# Patient Record
Sex: FEMALE | Race: WHITE | HISPANIC OR LATINO
[De-identification: names, ages, dates, MRNs, and addresses within clinical notes are randomized per-mention and may not be internally consistent; named-entity substitution may affect disease eponyms.]

---

## 2019-01-04 ENCOUNTER — TRANSCRIPTION ENCOUNTER (OUTPATIENT)
Age: 24
End: 2019-01-04

## 2019-10-09 ENCOUNTER — LABORATORY RESULT (OUTPATIENT)
Age: 24
End: 2019-10-09

## 2019-10-10 ENCOUNTER — APPOINTMENT (OUTPATIENT)
Dept: FAMILY MEDICINE | Facility: CLINIC | Age: 24
End: 2019-10-10
Payer: OTHER GOVERNMENT

## 2019-10-10 VITALS
BODY MASS INDEX: 21.69 KG/M2 | HEIGHT: 66 IN | TEMPERATURE: 98.4 F | RESPIRATION RATE: 16 BRPM | HEART RATE: 89 BPM | WEIGHT: 135 LBS | SYSTOLIC BLOOD PRESSURE: 114 MMHG | OXYGEN SATURATION: 98 % | DIASTOLIC BLOOD PRESSURE: 81 MMHG

## 2019-10-10 DIAGNOSIS — Z13.220 ENCOUNTER FOR SCREENING FOR LIPOID DISORDERS: ICD-10-CM

## 2019-10-10 DIAGNOSIS — Z83.3 FAMILY HISTORY OF DIABETES MELLITUS: ICD-10-CM

## 2019-10-10 DIAGNOSIS — Z78.9 OTHER SPECIFIED HEALTH STATUS: ICD-10-CM

## 2019-10-10 DIAGNOSIS — Z13.1 ENCOUNTER FOR SCREENING FOR DIABETES MELLITUS: ICD-10-CM

## 2019-10-10 DIAGNOSIS — Z80.3 FAMILY HISTORY OF MALIGNANT NEOPLASM OF BREAST: ICD-10-CM

## 2019-10-10 DIAGNOSIS — Z13.21 ENCOUNTER FOR SCREENING FOR NUTRITIONAL DISORDER: ICD-10-CM

## 2019-10-10 PROCEDURE — 99385 PREV VISIT NEW AGE 18-39: CPT | Mod: 25

## 2019-10-10 PROCEDURE — 36415 COLL VENOUS BLD VENIPUNCTURE: CPT

## 2019-10-10 NOTE — ASSESSMENT
[FreeTextEntry1] : CPE_ Well exam, comprehensive labs ordered. STD panel sent. PAP scheduled for today with GYN. F/u results.

## 2019-10-10 NOTE — HISTORY OF PRESENT ILLNESS
[de-identified] : 25 yo female here for CPE and establishment of care. Feeling well today, no complaints. \par \par H/o RA diagnosed at age 17. Had been seeing a rheumatologist. Used to have a lot of joint pain, but did some dietary modifications and the pain improved. Hasn't seen a rheum in a long time. No joint pain recently.

## 2019-10-10 NOTE — PHYSICAL EXAM
[No Acute Distress] : no acute distress [Well Nourished] : well nourished [Well Developed] : well developed [Normal Sclera/Conjunctiva] : normal sclera/conjunctiva [Well-Appearing] : well-appearing [PERRL] : pupils equal round and reactive to light [EOMI] : extraocular movements intact [Normal Oropharynx] : the oropharynx was normal [Normal Outer Ear/Nose] : the outer ears and nose were normal in appearance [Supple] : supple [No Lymphadenopathy] : no lymphadenopathy [No JVD] : no jugular venous distention [Thyroid Normal, No Nodules] : the thyroid was normal and there were no nodules present [No Respiratory Distress] : no respiratory distress  [Clear to Auscultation] : lungs were clear to auscultation bilaterally [No Accessory Muscle Use] : no accessory muscle use [Regular Rhythm] : with a regular rhythm [Normal Rate] : normal rate  [Normal S1, S2] : normal S1 and S2 [No Murmur] : no murmur heard [No Varicosities] : no varicosities [No Abdominal Bruit] : a ~M bruit was not heard ~T in the abdomen [No Edema] : there was no peripheral edema [No Palpable Aorta] : no palpable aorta [Soft] : abdomen soft [No Extremity Clubbing/Cyanosis] : no extremity clubbing/cyanosis [Non Tender] : non-tender [Non-distended] : non-distended [No Masses] : no abdominal mass palpated [No HSM] : no HSM [Normal Bowel Sounds] : normal bowel sounds [No CVA Tenderness] : no CVA  tenderness [Normal Posterior Cervical Nodes] : no posterior cervical lymphadenopathy [Normal Anterior Cervical Nodes] : no anterior cervical lymphadenopathy [Grossly Normal Strength/Tone] : grossly normal strength/tone [No Spinal Tenderness] : no spinal tenderness [No Joint Swelling] : no joint swelling [No Rash] : no rash [Coordination Grossly Intact] : coordination grossly intact [No Focal Deficits] : no focal deficits [Normal Affect] : the affect was normal [Deep Tendon Reflexes (DTR)] : deep tendon reflexes were 2+ and symmetric [Normal Gait] : normal gait [Normal Insight/Judgement] : insight and judgment were intact

## 2019-10-10 NOTE — HEALTH RISK ASSESSMENT
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

## 2019-10-11 ENCOUNTER — TRANSCRIPTION ENCOUNTER (OUTPATIENT)
Age: 24
End: 2019-10-11

## 2019-10-11 LAB
25(OH)D3 SERPL-MCNC: 11.9 NG/ML
ALBUMIN SERPL ELPH-MCNC: 4.5 G/DL
ALP BLD-CCNC: 85 U/L
ALT SERPL-CCNC: 18 U/L
ANION GAP SERPL CALC-SCNC: 14 MMOL/L
AST SERPL-CCNC: 20 U/L
BASOPHILS # BLD AUTO: 0.03 K/UL
BASOPHILS NFR BLD AUTO: 0.7 %
BILIRUB SERPL-MCNC: 0.3 MG/DL
BUN SERPL-MCNC: 15 MG/DL
CALCIUM SERPL-MCNC: 9.5 MG/DL
CHLORIDE SERPL-SCNC: 104 MMOL/L
CHOLEST SERPL-MCNC: 124 MG/DL
CHOLEST/HDLC SERPL: 2.4 RATIO
CO2 SERPL-SCNC: 22 MMOL/L
CREAT SERPL-MCNC: 0.66 MG/DL
EOSINOPHIL # BLD AUTO: 0.08 K/UL
EOSINOPHIL NFR BLD AUTO: 1.7 %
ESTIMATED AVERAGE GLUCOSE: 103 MG/DL
FOLATE SERPL-MCNC: 8.9 NG/ML
GLUCOSE SERPL-MCNC: 92 MG/DL
HAV IGM SER QL: NONREACTIVE
HBA1C MFR BLD HPLC: 5.2 %
HBV CORE IGM SER QL: NONREACTIVE
HBV SURFACE AG SER QL: NONREACTIVE
HCT VFR BLD CALC: 42.4 %
HCV AB SER QL: NONREACTIVE
HCV S/CO RATIO: 0.15 S/CO
HDLC SERPL-MCNC: 52 MG/DL
HGB BLD-MCNC: 13.4 G/DL
HIV1+2 AB SPEC QL IA.RAPID: NONREACTIVE
IMM GRANULOCYTES NFR BLD AUTO: 0.2 %
LDLC SERPL CALC-MCNC: 64 MG/DL
LYMPHOCYTES # BLD AUTO: 1.98 K/UL
LYMPHOCYTES NFR BLD AUTO: 43.1 %
MAN DIFF?: NORMAL
MCHC RBC-ENTMCNC: 30 PG
MCHC RBC-ENTMCNC: 31.6 GM/DL
MCV RBC AUTO: 94.9 FL
MONOCYTES # BLD AUTO: 0.33 K/UL
MONOCYTES NFR BLD AUTO: 7.2 %
NEUTROPHILS # BLD AUTO: 2.16 K/UL
NEUTROPHILS NFR BLD AUTO: 47.1 %
PLATELET # BLD AUTO: 352 K/UL
POTASSIUM SERPL-SCNC: 4.4 MMOL/L
PROT SERPL-MCNC: 7.2 G/DL
RBC # BLD: 4.47 M/UL
RBC # FLD: 12.5 %
SODIUM SERPL-SCNC: 140 MMOL/L
T PALLIDUM AB SER QL IA: NEGATIVE
T4 FREE SERPL-MCNC: 1.2 NG/DL
TRIGL SERPL-MCNC: 38 MG/DL
TSH SERPL-ACNC: 1.36 UIU/ML
VIT B12 SERPL-MCNC: 271 PG/ML
WBC # FLD AUTO: 4.59 K/UL

## 2019-10-16 ENCOUNTER — TRANSCRIPTION ENCOUNTER (OUTPATIENT)
Age: 24
End: 2019-10-16

## 2019-11-11 ENCOUNTER — LABORATORY RESULT (OUTPATIENT)
Age: 24
End: 2019-11-11

## 2019-11-12 ENCOUNTER — APPOINTMENT (OUTPATIENT)
Dept: RADIOLOGY | Facility: CLINIC | Age: 24
End: 2019-11-12

## 2019-11-12 ENCOUNTER — APPOINTMENT (OUTPATIENT)
Dept: RHEUMATOLOGY | Facility: CLINIC | Age: 24
End: 2019-11-12
Payer: OTHER GOVERNMENT

## 2019-11-12 VITALS
BODY MASS INDEX: 21.79 KG/M2 | DIASTOLIC BLOOD PRESSURE: 76 MMHG | HEIGHT: 66 IN | OXYGEN SATURATION: 98 % | WEIGHT: 135.56 LBS | TEMPERATURE: 98.6 F | SYSTOLIC BLOOD PRESSURE: 115 MMHG | HEART RATE: 86 BPM

## 2019-11-12 DIAGNOSIS — H04.123 DRY EYE SYNDROME OF BILATERAL LACRIMAL GLANDS: ICD-10-CM

## 2019-11-12 DIAGNOSIS — K90.9 INTESTINAL MALABSORPTION, UNSPECIFIED: ICD-10-CM

## 2019-11-12 PROCEDURE — 99205 OFFICE O/P NEW HI 60 MIN: CPT | Mod: 25

## 2019-11-12 PROCEDURE — 36415 COLL VENOUS BLD VENIPUNCTURE: CPT

## 2019-11-12 NOTE — PHYSICAL EXAM
[General Appearance - Alert] : alert [General Appearance - In No Acute Distress] : in no acute distress [General Appearance - Well Nourished] : well nourished [General Appearance - Well Developed] : well developed [Extraocular Movements] : extraocular movements were intact [Sclera] : the sclera and conjunctiva were normal [General Appearance - Well-Appearing] : healthy appearing [Outer Ear] : the ears and nose were normal in appearance [Oropharynx] : the oropharynx was normal [Examination Of The Oral Cavity] : the lips and gums were normal [Neck Appearance] : the appearance of the neck was normal [Neck Cervical Mass (___cm)] : no neck mass was observed [Jugular Venous Distention Increased] : there was no jugular-venous distention [Thyroid Nodule] : there were no palpable thyroid nodules [Thyroid Diffuse Enlargement] : the thyroid was not enlarged [Heart Rate And Rhythm] : heart rate was normal and rhythm regular [Auscultation Breath Sounds / Voice Sounds] : lungs were clear to auscultation bilaterally [Heart Sounds] : normal S1 and S2 [Heart Sounds Gallop] : no gallops [Murmurs] : no murmurs [Heart Sounds Pericardial Friction Rub] : no pericardial rub [Full Pulse] : the pedal pulses are present [Edema] : there was no peripheral edema [Veins - Varicosity Changes] : there were no varicosital changes [Bowel Sounds] : normal bowel sounds [Abdomen Soft] : soft [Abdomen Tenderness] : non-tender [Abdomen Mass (___ Cm)] : no abdominal mass palpated [Cervical Lymph Nodes Enlarged Posterior Bilaterally] : posterior cervical [Cervical Lymph Nodes Enlarged Anterior Bilaterally] : anterior cervical [Supraclavicular Lymph Nodes Enlarged Bilaterally] : supraclavicular [No CVA Tenderness] : no ~M costovertebral angle tenderness [Axillary Lymph Nodes Enlarged Bilaterally] : axillary [Abnormal Walk] : normal gait [Musculoskeletal - Swelling] : no joint swelling seen [Nail Clubbing] : no clubbing  or cyanosis of the fingernails [Motor Tone] : muscle strength and tone were normal [] : no rash [Skin Lesions] : no skin lesions [Motor Exam] : the motor exam was normal [Sensation] : the sensory exam was normal to light touch and pinprick [Impaired Insight] : insight and judgment were intact [Oriented To Time, Place, And Person] : oriented to person, place, and time [Mood] : the mood was normal [Affect] : the affect was normal [FreeTextEntry1] : no small or large joint tenderness or synovitis, b/l elbow hypermobile, no other joint hypermobility.

## 2019-11-12 NOTE — ASSESSMENT
[FreeTextEntry1] : 23 y/o F with hx of Vit D deficiency, ? RA diagnosed at age 17 based on + RF and joint symptoms, saw Pediatric Rheumatologist once and has never received any RA treatment. \par 2 episode of seizures and ? stroke as a teenager ? chronic migraine HA. \par Her symptoms almost resolved with very occasional knee pain and b/l hand/finger pain. \par Most recent work up  labs noted negative  PAU, RF, CCP and ESR/CRP\par Pt ROS and exam unremarkable. \par NO evidence of active arthritis or deformities. \par At present low suspicious for RA or any inflammatory/autoimmune disease. \par \par Pt will get back to me with the name of previous Rheumatologist to obtain records, most likely she had transient RF elevation in the setting of viral infection. \par Check urine pregnancy test and if negative she can proceed for hand/wrist and knee xrays to see if any evidence of RA related joint erosions or damage. \par APL serology \par r/u Celiac disease as pt reports chronic Vit D deficiency and hx of arthralgia and resolution of her symptoms after diet modification in the past. \par \par f/u in 3 months. \par \par

## 2019-11-12 NOTE — REVIEW OF SYSTEMS
[Dry Eyes] : dryness of the eyes [Palpitations] : palpitations [Chills] : no chills [Fever] : no fever [Feeling Tired] : not feeling tired [Feeling Poorly] : not feeling poorly [Recent Weight Loss (___ Lbs)] : no recent weight loss [Recent Weight Gain (___ Lbs)] : no recent weight gain [Red Eyes] : eyes not red [Eyesight Problems] : no eyesight problems [Discharge From Eyes] : no purulent discharge from the eyes [Eyes Itch] : no itching of the eyes [Earache] : no earache [Nosebleeds] : no nosebleeds [Loss Of Hearing] : no hearing loss [Sore Throat] : no sore throat [Nasal Discharge] : no nasal discharge [Hoarseness] : no hoarseness [Chest Pain] : no chest pain [Heart Rate Is Slow] : the heart rate was not slow [Heart Rate Is Fast] : the heart rate was not fast [Lower Ext Edema] : no lower extremity edema [Leg Claudication] : no intermittent leg claudication [Wheezing] : no wheezing [Shortness Of Breath] : no shortness of breath [SOB on Exertion] : no shortness of breath during exertion [Abdominal Pain] : no abdominal pain [Cough] : no cough [Constipation] : no constipation [Vomiting] : no vomiting [Diarrhea] : no diarrhea [Melena] : no melena [Heartburn] : no heartburn [Joint Pain] : no joint pain [Arthralgias] : no arthralgias [Joint Stiffness] : no joint stiffness [Joint Swelling] : no joint swelling [Anxiety] : no anxiety [Sleep Disturbances] : no sleep disturbances [Depression] : no depression [Muscle Weakness] : no muscle weakness [Easy Bleeding] : no tendency for easy bleeding [Easy Bruising] : no tendency for easy bruising [Swollen Glands In The Neck] : no swollen glands in the neck [Swollen Glands] : no swollen glands

## 2019-11-12 NOTE — HISTORY OF PRESENT ILLNESS
[Anorexia] : anorexia [Arthralgias] : arthralgias [Dry Eyes] : dry eyes [Weight Loss] : no weight loss [FreeTextEntry1] : Referred by Dr. Castro for Rheumatology consultation\par \par 23 y/o F chronic Vit D deficiency since childhood required replacement   RA diagnosed at age 17. Had been seeing a pediatric rheumatologist at [facility], does not remember the name, told to have + RF, but denies treatment with steroids, DMARDs, IA steroid injection or biologics.  Used to have a lot of joint pain, but did some dietary modifications and the pain improved. Hasn't seen a rheum in a long time. No joint pain recently. \par hx of 2 episodes of seizures at age 16, had MRI and CT done and told possible old stroke ? no recurrence of seizure, has chronic  HA, diagnosed migraine. \par She had recent Rheum work up with PMD and labs noted negative for PAU, RF, CCP and ESR/CRP\par intermittently she feels b/l knee pain and hand pain, occasionally finger swelling\par Denies hx of DVT or bleeding. \par No hx of uveitis\par No h/o morning stiffness, memory loss, patchy hair loss, sicca symptoms, photosensitivity, HA,  Raynaud's, oral ulcers, nasal ulcers. \par Personal or family hx of autoimmune disease, no hx of psoriasis\par \par  [Malaise] : no malaise [Chills] : no chills [Fever] : no fever [Depression] : no depression [Fatigue] : no fatigue [Malar Facial Rash] : no malar facial rash [Skin Lesions] : no lesions [Skin Nodules] : no skin nodules [Cough] : no cough [Oral Ulcers] : no oral ulcers [Dysphonia] : no dysphonia [Dry Mouth] : no dry mouth [Dysphagia] : no dysphagia [Chest Pain] : no chest pain [Joint Swelling] : no joint swelling [Shortness of Breath] : no shortness of breath [Joint Deformity] : no joint deformity [Decreased ROM] : no decreased range of motion [Joint Warmth] : no joint warmth [Morning Stiffness] : no morning stiffness [Falls] : no falls [Difficulty Standing] : no difficulty standing [Difficulty Walking] : no difficulty walking [Dyspnea] : no dyspnea [Myalgias] : no myalgias [Muscle Spasms] : no muscle spasms [Muscle Cramping] : no muscle cramping [Muscle Weakness] : no muscle weakness [Eye Redness] : no eye redness [Visual Changes] : no visual changes [Eye Pain] : no eye pain

## 2019-11-13 LAB
DEPRECATED KAPPA LC FREE/LAMBDA SER: 0.89 RATIO
IGA SER QL IEP: 185 MG/DL
IGA SER QL IEP: 185 MG/DL
IGG SER QL IEP: 1047 MG/DL
IGM SER QL IEP: 297 MG/DL
KAPPA LC CSF-MCNC: 1.71 MG/DL
KAPPA LC SERPL-MCNC: 1.53 MG/DL
TTG IGA SER IA-ACNC: <1.2 U/ML
TTG IGA SER-ACNC: NEGATIVE
TTG IGG SER IA-ACNC: 1.3 U/ML
TTG IGG SER IA-ACNC: NEGATIVE

## 2019-11-14 LAB
B2 GLYCOPROT1 IGA SERPL IA-ACNC: <5 SAU
B2 GLYCOPROT1 IGG SER-ACNC: <5 SGU
B2 GLYCOPROT1 IGM SER-ACNC: <5 SMU
DEPRECATED CARDIOLIPIN IGA SER: <5 APL
ENDOMYSIUM IGA SER QL: NEGATIVE
ENDOMYSIUM IGA TITR SER: NORMAL

## 2019-11-15 ENCOUNTER — RESULT REVIEW (OUTPATIENT)
Age: 24
End: 2019-11-15

## 2019-11-15 LAB
CARDIOLIPIN IGM SER-MCNC: 25.8 MPL
CARDIOLIPIN IGM SER-MCNC: 6.8 GPL
ENA SS-A AB SER IA-ACNC: <0.2 AL
ENA SS-B AB SER IA-ACNC: <0.2 AL

## 2019-11-18 ENCOUNTER — APPOINTMENT (OUTPATIENT)
Dept: RADIOLOGY | Facility: CLINIC | Age: 24
End: 2019-11-18
Payer: OTHER GOVERNMENT

## 2019-11-18 ENCOUNTER — OUTPATIENT (OUTPATIENT)
Dept: OUTPATIENT SERVICES | Facility: HOSPITAL | Age: 24
LOS: 1 days | End: 2019-11-18

## 2019-11-18 PROCEDURE — 71046 X-RAY EXAM CHEST 2 VIEWS: CPT | Mod: 26

## 2019-11-18 PROCEDURE — 73130 X-RAY EXAM OF HAND: CPT | Mod: 26,50

## 2019-11-18 PROCEDURE — 73564 X-RAY EXAM KNEE 4 OR MORE: CPT | Mod: 26,50

## 2019-11-18 PROCEDURE — 73110 X-RAY EXAM OF WRIST: CPT | Mod: 26,50

## 2019-11-21 ENCOUNTER — APPOINTMENT (OUTPATIENT)
Dept: INFECTIOUS DISEASE | Facility: CLINIC | Age: 24
End: 2019-11-21
Payer: OTHER GOVERNMENT

## 2019-11-21 VITALS
HEART RATE: 97 BPM | WEIGHT: 135 LBS | BODY MASS INDEX: 21.69 KG/M2 | SYSTOLIC BLOOD PRESSURE: 124 MMHG | TEMPERATURE: 98.5 F | HEIGHT: 66 IN | OXYGEN SATURATION: 98 % | DIASTOLIC BLOOD PRESSURE: 85 MMHG | RESPIRATION RATE: 16 BRPM

## 2019-11-21 DIAGNOSIS — M06.9 RHEUMATOID ARTHRITIS, UNSPECIFIED: ICD-10-CM

## 2019-11-21 PROCEDURE — 99204 OFFICE O/P NEW MOD 45 MIN: CPT

## 2019-11-21 NOTE — HISTORY OF PRESENT ILLNESS
[FreeTextEntry1] : 23 yo female with ?RA presenting for initial ID evaluation of positive QFT 11/12/19. She is originally from [place]; no known TB contacts. Had PPD as child--thinks it was negative. Not a HCW; no correction exposure. Has traveled to [place] and [place] in the past two years. Feels chest tightness; occasional non productive cough. No B symptoms.

## 2019-11-21 NOTE — PHYSICAL EXAM
[General Appearance - Alert] : alert [General Appearance - In No Acute Distress] : in no acute distress [Auscultation Breath Sounds / Voice Sounds] : lungs were clear to auscultation bilaterally [Heart Rate And Rhythm] : heart rate was normal and rhythm regular [Heart Sounds] : normal S1 and S2 [Heart Sounds Gallop] : no gallops [Murmurs] : no murmurs [Heart Sounds Pericardial Friction Rub] : no pericardial rub [Full Pulse] : the pedal pulses are present [Edema] : there was no peripheral edema [Bowel Sounds] : normal bowel sounds [Abdomen Soft] : soft [Abdomen Tenderness] : non-tender [Abdomen Mass (___ Cm)] : no abdominal mass palpated [Costovertebral Angle Tenderness] : no CVA tenderness [Skin Color & Pigmentation] : normal skin color and pigmentation [] : no rash [No Focal Deficits] : no focal deficits [Oriented To Time, Place, And Person] : oriented to person, place, and time [Affect] : the affect was normal

## 2019-11-21 NOTE — REVIEW OF SYSTEMS
[Shortness Of Breath] : shortness of breath [Pleuritic Chest Pain] : pleuritic chest pain [Negative] : Heme/Lymph

## 2019-11-21 NOTE — ASSESSMENT
[FreeTextEntry1] : 23 yo female with ?RA being referred for positive QFT; CXR 11/18 wnl; c/w LTB.\par - start INH + B6 X 6 months\par - patient counseled on EtOH avoidance while on INH\par - should she need to commence immunosuppression for ?RA would be reasonable to start this one month after she has been on LTB treatment\par \par rtc 2 months [Treatment Education] : treatment education [Rx Dose / Side Effects] : Rx dose/side effects [Anticipatory Guidance] : anticipatory guidance

## 2019-11-22 RX ORDER — PYRIDOXINE HCL (VITAMIN B6) 25 MG
25 TABLET ORAL DAILY
Qty: 30 | Refills: 5 | Status: DISCONTINUED | COMMUNITY
Start: 2019-11-21 | End: 2019-11-22

## 2019-11-22 RX ORDER — ISONIAZID 300 MG/1
300 TABLET ORAL
Qty: 30 | Refills: 5 | Status: DISCONTINUED | COMMUNITY
Start: 2019-11-21 | End: 2019-11-22

## 2019-12-10 ENCOUNTER — RX RENEWAL (OUTPATIENT)
Age: 24
End: 2019-12-10

## 2019-12-10 RX ORDER — CHOLECALCIFEROL (VITAMIN D3) 25 MCG
25 MCG CAPSULE ORAL
Qty: 90 | Refills: 1 | Status: ACTIVE | COMMUNITY
Start: 2019-12-10 | End: 1900-01-01

## 2019-12-26 ENCOUNTER — TRANSCRIPTION ENCOUNTER (OUTPATIENT)
Age: 24
End: 2019-12-26

## 2020-02-11 ENCOUNTER — LABORATORY RESULT (OUTPATIENT)
Age: 25
End: 2020-02-11

## 2020-02-11 ENCOUNTER — APPOINTMENT (OUTPATIENT)
Dept: RHEUMATOLOGY | Facility: CLINIC | Age: 25
End: 2020-02-11
Payer: OTHER GOVERNMENT

## 2020-02-11 VITALS
HEIGHT: 66 IN | DIASTOLIC BLOOD PRESSURE: 82 MMHG | BODY MASS INDEX: 21.05 KG/M2 | HEART RATE: 97 BPM | WEIGHT: 131 LBS | TEMPERATURE: 98.7 F | SYSTOLIC BLOOD PRESSURE: 122 MMHG | OXYGEN SATURATION: 97 %

## 2020-02-11 DIAGNOSIS — Z79.899 OTHER LONG TERM (CURRENT) DRUG THERAPY: ICD-10-CM

## 2020-02-11 PROCEDURE — 36415 COLL VENOUS BLD VENIPUNCTURE: CPT

## 2020-02-11 PROCEDURE — 99214 OFFICE O/P EST MOD 30 MIN: CPT | Mod: 25

## 2020-02-11 NOTE — PHYSICAL EXAM
[General Appearance - Alert] : alert [General Appearance - Well Nourished] : well nourished [General Appearance - Well Developed] : well developed [General Appearance - In No Acute Distress] : in no acute distress [General Appearance - Well-Appearing] : healthy appearing [Sclera] : the sclera and conjunctiva were normal [Outer Ear] : the ears and nose were normal in appearance [Extraocular Movements] : extraocular movements were intact [Examination Of The Oral Cavity] : the lips and gums were normal [Oropharynx] : the oropharynx was normal [Neck Appearance] : the appearance of the neck was normal [Neck Cervical Mass (___cm)] : no neck mass was observed [Jugular Venous Distention Increased] : there was no jugular-venous distention [Thyroid Nodule] : there were no palpable thyroid nodules [Thyroid Diffuse Enlargement] : the thyroid was not enlarged [Heart Rate And Rhythm] : heart rate was normal and rhythm regular [Auscultation Breath Sounds / Voice Sounds] : lungs were clear to auscultation bilaterally [Murmurs] : no murmurs [Heart Sounds] : normal S1 and S2 [Heart Sounds Gallop] : no gallops [Heart Sounds Pericardial Friction Rub] : no pericardial rub [Edema] : there was no peripheral edema [Full Pulse] : the pedal pulses are present [Veins - Varicosity Changes] : there were no varicosital changes [Abdomen Tenderness] : non-tender [Abdomen Soft] : soft [Bowel Sounds] : normal bowel sounds [Abdomen Mass (___ Cm)] : no abdominal mass palpated [Cervical Lymph Nodes Enlarged Anterior Bilaterally] : anterior cervical [Cervical Lymph Nodes Enlarged Posterior Bilaterally] : posterior cervical [Supraclavicular Lymph Nodes Enlarged Bilaterally] : supraclavicular [No CVA Tenderness] : no ~M costovertebral angle tenderness [Axillary Lymph Nodes Enlarged Bilaterally] : axillary [Abnormal Walk] : normal gait [Nail Clubbing] : no clubbing  or cyanosis of the fingernails [Musculoskeletal - Swelling] : no joint swelling seen [Motor Tone] : muscle strength and tone were normal [] : no rash [Skin Lesions] : no skin lesions [Sensation] : the sensory exam was normal to light touch and pinprick [Motor Exam] : the motor exam was normal [Oriented To Time, Place, And Person] : oriented to person, place, and time [Impaired Insight] : insight and judgment were intact [Affect] : the affect was normal [Mood] : the mood was normal [FreeTextEntry1] : no small or large joint tenderness or synovitis, b/l elbow hypermobile, no other joint hypermobility.

## 2020-02-11 NOTE — HISTORY OF PRESENT ILLNESS
[Anorexia] : anorexia [Arthralgias] : arthralgias [Dry Eyes] : dry eyes [___ Month(s) Ago] : [unfilled] month(s) ago [FreeTextEntry1] : Follow up: 2/11/20\par Pt is asymptomatic with no joint pain or swelling. \par Rheum labs and Xrays does not support RA( RF and CCP negative) PAU negative. \par MIldly elevated anti cardiolipin Ab IgM 25. Lupus AC ( DRVVT and mixing studies not reported for unknown reason from previous visit) \par She did not bring me previous pediatric notes. Has seen Pediatric Rheumatologist only once in the past. \par + Tb quantiferon, saw Dr. Phan and on INH therapy recommended for 6 months, she is taking it for 3 months now, no follow up labs LFTS checked.  \par \par \par \par Referred by Dr. Castro for Rheumatology consultation\par \par 23 y/o F chronic Vit D deficiency since childhood required replacement   RA diagnosed at age 17. Had been seeing a pediatric rheumatologist at [facility], does not remember the name, told to have + RF, but denies treatment with steroids, DMARDs, IA steroid injection or biologics.  Used to have a lot of joint pain, but did some dietary modifications and the pain improved. Hasn't seen a rheum in a long time. No joint pain recently. \par hx of 2 episodes of seizures at age 16, had MRI and CT done and told possible old stroke ? no recurrence of seizure, has chronic  HA, diagnosed migraine. \par She had recent Rheum work up with PMD and labs noted negative for PAU, RF, CCP and ESR/CRP\par intermittently she feels b/l knee pain and hand pain, occasionally finger swelling\par Denies hx of DVT or bleeding. \par No hx of uveitis\par No h/o morning stiffness, memory loss, patchy hair loss, sicca symptoms, photosensitivity, HA,  Raynaud's, oral ulcers, nasal ulcers. \par Personal or family hx of autoimmune disease, no hx of psoriasis\par \par  [Weight Loss] : no weight loss [Malaise] : no malaise [Fever] : no fever [Chills] : no chills [Fatigue] : no fatigue [Depression] : no depression [Malar Facial Rash] : no malar facial rash [Skin Lesions] : no lesions [Skin Nodules] : no skin nodules [Oral Ulcers] : no oral ulcers [Cough] : no cough [Dry Mouth] : no dry mouth [Dysphonia] : no dysphonia [Dysphagia] : no dysphagia [Shortness of Breath] : no shortness of breath [Chest Pain] : no chest pain [Joint Warmth] : no joint warmth [Joint Deformity] : no joint deformity [Joint Swelling] : no joint swelling [Falls] : no falls [Decreased ROM] : no decreased range of motion [Morning Stiffness] : no morning stiffness [Difficulty Walking] : no difficulty walking [Difficulty Standing] : no difficulty standing [Dyspnea] : no dyspnea [Muscle Spasms] : no muscle spasms [Muscle Weakness] : no muscle weakness [Myalgias] : no myalgias [Visual Changes] : no visual changes [Muscle Cramping] : no muscle cramping [Eye Pain] : no eye pain [Eye Redness] : no eye redness

## 2020-02-11 NOTE — DATA REVIEWED
[FreeTextEntry1] : \par EXAM: XR WRIST COMP MIN 3 VIEWS BI \par EXAM: XR HAND MIN 3 VIEWS BI \par \par PROCEDURE DATE: 11/18/2019 \par \par \par \par \par INTERPRETATION: XR HAND 3 VIEWS BILATERAL, XR WRIST COMPLETE 3 VIEWS \par BILATERAL dated 11/18/2019 9:24 AM \par \par INDICATION: Rheumatoid arthritis. Evaluate for erosions. \par \par COMPARISON: None \par \par FINDINGS: \par Three views of the bilateral hands and wrist are submitted for evaluation. \par \par No acute fracture or dislocation. No soft tissue swelling. No periarticular \par erosions. Normal osseous mineralization. Carpal and metacarpal alignment is \par unremarkable. \par \par IMPRESSION: \par Unremarkable bilateral hand and wrist radiographs.

## 2020-02-11 NOTE — ASSESSMENT
[FreeTextEntry1] : 23 y/o F with hx of Vit D deficiency, ? RA diagnosed at age 17 based on + RF and joint symptoms, saw Pediatric Rheumatologist once and has never received any RA specific  treatment. \par 2 episode of seizures and ? stroke as a teenager ? chronic migraine HA. \par Her symptoms almost resolved with very occasional knee pain and b/l hand/finger pain. \par Most recent work up  labs noted negative  PAU, RF, CCP and ESR/CRP, xrays unremarkable for erosions. joint space narrowing or any other inflammatory changes. \par Pt ROS and exam unremarkable. \par NO evidence of active arthritis or deformities. \par At present her symptoms, normal labs and xrays does not support inflammatory arthritis/RA or any other autoimmune disease. \par Noted mildly elevated anti cardiolipin IgM (25), which does not seem to be significant at this level. \par \par Pt will get back to me with the name of previous Rheumatologist to obtain records, most likely she had transient RF elevation in the setting of viral infection. \par will repeat full aPL serologies, she has low titer IgM cardiolipin Ab +\par Positive results from aPL testing on two tests  with value >40 =12 weeks apart satisfies the laboratory criteria for the classification of APS. \par Pt with no hx of miscarriage or DVT. 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 \par \par f/u in 6 months or earlier prn \par \par

## 2020-02-11 NOTE — REVIEW OF SYSTEMS
[Chills] : no chills [Fever] : no fever [Feeling Poorly] : not feeling poorly [Recent Weight Gain (___ Lbs)] : no recent weight gain [Feeling Tired] : not feeling tired [Red Eyes] : eyes not red [Eyesight Problems] : no eyesight problems [Recent Weight Loss (___ Lbs)] : no recent weight loss [Dry Eyes] : no dryness of the eyes [Discharge From Eyes] : no purulent discharge from the eyes [Loss Of Hearing] : no hearing loss [Earache] : no earache [Eyes Itch] : no itching of the eyes [Sore Throat] : no sore throat [Nasal Discharge] : no nasal discharge [Nosebleeds] : no nosebleeds [Heart Rate Is Fast] : the heart rate was not fast [Heart Rate Is Slow] : the heart rate was not slow [Hoarseness] : no hoarseness [Palpitations] : no palpitations [Leg Claudication] : no intermittent leg claudication [Chest Pain] : no chest pain [Lower Ext Edema] : no lower extremity edema [Shortness Of Breath] : no shortness of breath [Wheezing] : no wheezing [Abdominal Pain] : no abdominal pain [SOB on Exertion] : no shortness of breath during exertion [Cough] : no cough [Constipation] : no constipation [Vomiting] : no vomiting [Diarrhea] : no diarrhea [Melena] : no melena [Heartburn] : no heartburn [Arthralgias] : no arthralgias [Joint Pain] : no joint pain [Joint Swelling] : no joint swelling [Joint Stiffness] : no joint stiffness [Sleep Disturbances] : no sleep disturbances [Anxiety] : no anxiety [Easy Bleeding] : no tendency for easy bleeding [Muscle Weakness] : no muscle weakness [Depression] : no depression [Easy Bruising] : no tendency for easy bruising [Swollen Glands] : no swollen glands [Swollen Glands In The Neck] : no swollen glands in the neck

## 2020-02-12 ENCOUNTER — TRANSCRIPTION ENCOUNTER (OUTPATIENT)
Age: 25
End: 2020-02-12

## 2020-02-12 LAB
ALBUMIN SERPL ELPH-MCNC: 4.4 G/DL
ALP BLD-CCNC: 55 U/L
ALT SERPL-CCNC: 43 U/L
ANION GAP SERPL CALC-SCNC: 12 MMOL/L
AST SERPL-CCNC: 33 U/L
BASOPHILS # BLD AUTO: 0.03 K/UL
BASOPHILS NFR BLD AUTO: 0.6 %
BILIRUB SERPL-MCNC: 0.2 MG/DL
BUN SERPL-MCNC: 9 MG/DL
CALCIUM SERPL-MCNC: 9.1 MG/DL
CHLORIDE SERPL-SCNC: 103 MMOL/L
CO2 SERPL-SCNC: 24 MMOL/L
CONFIRM: 27.4 SEC
CREAT SERPL-MCNC: 0.67 MG/DL
DRVVT IMM 1:2 NP PPP: NORMAL
DRVVT SCREEN TO CONFIRM RATIO: 0.93 RATIO
EOSINOPHIL # BLD AUTO: 0.09 K/UL
EOSINOPHIL NFR BLD AUTO: 1.8 %
GLUCOSE SERPL-MCNC: 100 MG/DL
HCT VFR BLD CALC: 37.2 %
HGB BLD-MCNC: 11.8 G/DL
IMM GRANULOCYTES NFR BLD AUTO: 0.2 %
LYMPHOCYTES # BLD AUTO: 2.48 K/UL
LYMPHOCYTES NFR BLD AUTO: 50.6 %
MAN DIFF?: NORMAL
MCHC RBC-ENTMCNC: 29.1 PG
MCHC RBC-ENTMCNC: 31.7 GM/DL
MCV RBC AUTO: 91.6 FL
MONOCYTES # BLD AUTO: 0.5 K/UL
MONOCYTES NFR BLD AUTO: 10.2 %
NEUTROPHILS # BLD AUTO: 1.79 K/UL
NEUTROPHILS NFR BLD AUTO: 36.6 %
PLATELET # BLD AUTO: 358 K/UL
POTASSIUM SERPL-SCNC: 4.4 MMOL/L
PROT SERPL-MCNC: 7 G/DL
RBC # BLD: 4.06 M/UL
RBC # FLD: 12.4 %
SCREEN DRVVT: 28.1 SEC
SILICA CLOTTING TIME INTERPRETATION: NORMAL
SILICA CLOTTING TIME S/C: 0.7 RATIO
SODIUM SERPL-SCNC: 138 MMOL/L
WBC # FLD AUTO: 4.9 K/UL

## 2020-02-13 ENCOUNTER — TRANSCRIPTION ENCOUNTER (OUTPATIENT)
Age: 25
End: 2020-02-13

## 2020-02-13 LAB
B2 GLYCOPROT1 IGA SERPL IA-ACNC: <5 SAU
B2 GLYCOPROT1 IGG SER-ACNC: <5 SGU
B2 GLYCOPROT1 IGM SER-ACNC: 5.3 SMU
CARDIOLIPIN IGM SER-MCNC: 15.2 MPL
CARDIOLIPIN IGM SER-MCNC: <5 GPL
DEPRECATED CARDIOLIPIN IGA SER: <5 APL

## 2020-07-24 ENCOUNTER — TRANSCRIPTION ENCOUNTER (OUTPATIENT)
Age: 25
End: 2020-07-24

## 2020-08-27 ENCOUNTER — OUTPATIENT (OUTPATIENT)
Dept: OUTPATIENT SERVICES | Facility: HOSPITAL | Age: 25
LOS: 1 days | End: 2020-08-27
Payer: OTHER GOVERNMENT

## 2020-08-27 ENCOUNTER — APPOINTMENT (OUTPATIENT)
Dept: RADIOLOGY | Facility: CLINIC | Age: 25
End: 2020-08-27

## 2020-08-27 ENCOUNTER — APPOINTMENT (OUTPATIENT)
Dept: RHEUMATOLOGY | Facility: CLINIC | Age: 25
End: 2020-08-27
Payer: OTHER GOVERNMENT

## 2020-08-27 VITALS
HEART RATE: 89 BPM | SYSTOLIC BLOOD PRESSURE: 118 MMHG | DIASTOLIC BLOOD PRESSURE: 80 MMHG | BODY MASS INDEX: 20.89 KG/M2 | HEIGHT: 66 IN | OXYGEN SATURATION: 98 % | TEMPERATURE: 98.7 F | WEIGHT: 130 LBS

## 2020-08-27 DIAGNOSIS — R76.12 NONSPECIFIC REACTION TO CELL MEDIATED IMMUNITY MEASUREMENT OF GAMMA INTERFERON ANTIGEN RESPONSE W/OUT ACTIVE TUBERCULOSIS: ICD-10-CM

## 2020-08-27 DIAGNOSIS — J45.909 UNSPECIFIED ASTHMA, UNCOMPLICATED: ICD-10-CM

## 2020-08-27 LAB — HCG UR QL: NEGATIVE

## 2020-08-27 PROCEDURE — 99214 OFFICE O/P EST MOD 30 MIN: CPT | Mod: 25

## 2020-08-27 PROCEDURE — 81025 URINE PREGNANCY TEST: CPT

## 2020-08-27 PROCEDURE — 71046 X-RAY EXAM CHEST 2 VIEWS: CPT | Mod: 26

## 2020-08-27 PROCEDURE — 36415 COLL VENOUS BLD VENIPUNCTURE: CPT

## 2020-08-27 NOTE — HISTORY OF PRESENT ILLNESS
[___ Month(s) Ago] : [unfilled] month(s) ago [Anorexia] : anorexia [Arthralgias] : arthralgias [Dry Eyes] : dry eyes [FreeTextEntry1] : Follow up: 8/27/20 \par 26 y/o F \par Latent TB- completed INH 05/2020 \par low Titer cardiolipin IgM antibody- no clinical aPL. 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 \par tried Mucinex and Claritin which helped.  \par \par Follow up: 2/11/20\par Pt is asymptomatic with no joint pain or swelling. \par Rheum labs and Xrays does not support RA( RF and CCP negative) PAU negative. \par MIldly elevated anti cardiolipin Ab IgM 25. Lupus AC ( DRVVT and mixing studies not reported for unknown reason from previous visit) \par She did not bring me previous pediatric notes. Has seen Pediatric Rheumatologist only once in the past. \par + Tb quantiferon, saw Dr. Phan and on INH therapy recommended for 6 months, she is taking it for 3 months now, no follow up labs LFTS checked.  \par \par \par \par Referred by Dr. Castro for Rheumatology consultation\par \par 23 y/o F chronic Vit D deficiency since childhood required replacement   RA diagnosed at age 17. Had been seeing a pediatric rheumatologist at [facility], does not remember the name, told to have + RF, but denies treatment with steroids, DMARDs, IA steroid injection or biologics.  Used to have a lot of joint pain, but did some dietary modifications and the pain improved. Hasn't seen a rheum in a long time. No joint pain recently. \par hx of 2 episodes of seizures at age 16, had MRI and CT done and told possible old stroke ? no recurrence of seizure, has chronic  HA, diagnosed migraine. \par She had recent Rheum work up with PMD and labs noted negative for PAU, RF, CCP and ESR/CRP\par intermittently she feels b/l knee pain and hand pain, occasionally finger swelling\par Denies hx of DVT or bleeding. \par No hx of uveitis\par No h/o morning stiffness, memory loss, patchy hair loss, sicca symptoms, photosensitivity, HA,  Raynaud's, oral ulcers, nasal ulcers. \par Personal or family hx of autoimmune disease, no hx of psoriasis\par \par  [Malaise] : no malaise [Weight Loss] : no weight loss [Fever] : no fever [Chills] : no chills [Fatigue] : no fatigue [Depression] : no depression [Skin Nodules] : no skin nodules [Malar Facial Rash] : no malar facial rash [Skin Lesions] : no lesions [Oral Ulcers] : no oral ulcers [Dry Mouth] : no dry mouth [Cough] : no cough [Dysphonia] : no dysphonia [Dysphagia] : no dysphagia [Joint Swelling] : no joint swelling [Chest Pain] : no chest pain [Shortness of Breath] : no shortness of breath [Joint Deformity] : no joint deformity [Joint Warmth] : no joint warmth [Falls] : no falls [Decreased ROM] : no decreased range of motion [Morning Stiffness] : no morning stiffness [Difficulty Standing] : no difficulty standing [Difficulty Walking] : no difficulty walking [Dyspnea] : no dyspnea [Muscle Weakness] : no muscle weakness [Myalgias] : no myalgias [Muscle Cramping] : no muscle cramping [Muscle Spasms] : no muscle spasms [Visual Changes] : no visual changes [Eye Redness] : no eye redness [Eye Pain] : no eye pain

## 2020-08-27 NOTE — ASSESSMENT
[FreeTextEntry1] : 23 y/o F with hx of Vit D deficiency, ? RA diagnosed at age 17 based on + RF and joint symptoms, saw Pediatric Rheumatologist once and has never received any RA specific  treatment. \par 2 episode of seizures and ? stroke as a teenager ? chronic migraine HA. \par Her symptoms almost resolved with very occasional knee pain and b/l hand/finger pain. \par Most recent work up  labs noted negative  PAU, RF, CCP and ESR/CRP, xrays unremarkable for erosions. joint space narrowing or any other inflammatory changes. \par Pt ROS and exam unremarkable. \par NO evidence of active arthritis or deformities. \par At present her symptoms, normal labs and xrays does not support inflammatory arthritis/RA or any other autoimmune disease. \par Noted weakly positiveanti cardiolipin IgM X2 , which does not seem to be significant at this level. \par \par 1. No clinical APL\par Positive results from aPL testing on two tests  with value >40 =12 weeks apart satisfies the laboratory criteria for the classification of APS. \par No hx of pregnancy complications or VTE. \par Educated pt about weakly + APL labs and she should let her Ob know in the future when gets pregnant or let me know if any pregnancy issues or if any thrombosis in the future. \par \par \par 2. Latent TB: on INH therapy completed \par \par 3. New cough/ reactive airway disease  vs ? asthma 2/2 dust exposure\par Recommended to avoid allergen if possible \par CXR- negative pregnancy test done at office. \par labs\par Refer to Pulmonary for further evaluation. \par \par f/u in 1 year or earlier prn \par \par

## 2020-08-27 NOTE — REVIEW OF SYSTEMS
[Fever] : no fever [Chills] : no chills [Recent Weight Gain (___ Lbs)] : no recent weight gain [Feeling Poorly] : not feeling poorly [Feeling Tired] : not feeling tired [Red Eyes] : eyes not red [Recent Weight Loss (___ Lbs)] : no recent weight loss [Eyesight Problems] : no eyesight problems [Dry Eyes] : no dryness of the eyes [Discharge From Eyes] : no purulent discharge from the eyes [Eyes Itch] : no itching of the eyes [Earache] : no earache [Nasal Discharge] : no nasal discharge [Loss Of Hearing] : no hearing loss [Nosebleeds] : no nosebleeds [Hoarseness] : no hoarseness [Heart Rate Is Slow] : the heart rate was not slow [Sore Throat] : no sore throat [Heart Rate Is Fast] : the heart rate was not fast [Chest Pain] : no chest pain [Palpitations] : no palpitations [Leg Claudication] : no intermittent leg claudication [Shortness Of Breath] : no shortness of breath [Lower Ext Edema] : no lower extremity edema [Wheezing] : no wheezing [Cough] : no cough [SOB on Exertion] : no shortness of breath during exertion [Abdominal Pain] : no abdominal pain [Vomiting] : no vomiting [Heartburn] : no heartburn [Constipation] : no constipation [Diarrhea] : no diarrhea [Melena] : no melena [Joint Pain] : no joint pain [Arthralgias] : no arthralgias [Joint Swelling] : no joint swelling [Joint Stiffness] : no joint stiffness [Sleep Disturbances] : no sleep disturbances [Depression] : no depression [Anxiety] : no anxiety [Muscle Weakness] : no muscle weakness [Easy Bleeding] : no tendency for easy bleeding [Easy Bruising] : no tendency for easy bruising [Swollen Glands In The Neck] : no swollen glands in the neck [Swollen Glands] : no swollen glands

## 2020-08-27 NOTE — PHYSICAL EXAM
[General Appearance - Alert] : alert [General Appearance - Well Developed] : well developed [General Appearance - In No Acute Distress] : in no acute distress [General Appearance - Well-Appearing] : healthy appearing [General Appearance - Well Nourished] : well nourished [Sclera] : the sclera and conjunctiva were normal [Neck Appearance] : the appearance of the neck was normal [Examination Of The Oral Cavity] : the lips and gums were normal [Oropharynx] : the oropharynx was normal [Auscultation Breath Sounds / Voice Sounds] : lungs were clear to auscultation bilaterally [Heart Sounds] : normal S1 and S2 [Heart Rate And Rhythm] : heart rate was normal and rhythm regular [Heart Sounds Gallop] : no gallops [Murmurs] : no murmurs [Heart Sounds Pericardial Friction Rub] : no pericardial rub [Edema] : there was no peripheral edema [Veins - Varicosity Changes] : there were no varicosital changes [Bowel Sounds] : normal bowel sounds [Abdomen Soft] : soft [Abdomen Mass (___ Cm)] : no abdominal mass palpated [Abdomen Tenderness] : non-tender [No CVA Tenderness] : no ~M costovertebral angle tenderness [Nail Clubbing] : no clubbing  or cyanosis of the fingernails [Abnormal Walk] : normal gait [Musculoskeletal - Swelling] : no joint swelling seen [Motor Tone] : muscle strength and tone were normal [Skin Lesions] : no skin lesions [] : no rash [Oriented To Time, Place, And Person] : oriented to person, place, and time [Impaired Insight] : insight and judgment were intact [Affect] : the affect was normal [Mood] : the mood was normal [Respiration, Rhythm And Depth] : normal respiratory rhythm and effort [Exaggerated Use Of Accessory Muscles For Inspiration] : no accessory muscle use [FreeTextEntry1] : no small or large joint tenderness or synovitis, b/l elbow hypermobile, no other joint hypermobility.

## 2020-08-28 ENCOUNTER — TRANSCRIPTION ENCOUNTER (OUTPATIENT)
Age: 25
End: 2020-08-28

## 2020-08-28 LAB
ALBUMIN SERPL ELPH-MCNC: 4.5 G/DL
ALP BLD-CCNC: 62 U/L
ALT SERPL-CCNC: 15 U/L
ANION GAP SERPL CALC-SCNC: 10 MMOL/L
AST SERPL-CCNC: 16 U/L
BASOPHILS # BLD AUTO: 0.04 K/UL
BASOPHILS NFR BLD AUTO: 0.7 %
BILIRUB SERPL-MCNC: 0.2 MG/DL
BUN SERPL-MCNC: 10 MG/DL
CALCIUM SERPL-MCNC: 9.3 MG/DL
CHLORIDE SERPL-SCNC: 103 MMOL/L
CO2 SERPL-SCNC: 24 MMOL/L
CREAT SERPL-MCNC: 0.65 MG/DL
EOSINOPHIL # BLD AUTO: 0.57 K/UL
EOSINOPHIL NFR BLD AUTO: 9.4 %
GLUCOSE SERPL-MCNC: 76 MG/DL
HCT VFR BLD CALC: 40.2 %
HGB BLD-MCNC: 12.5 G/DL
IMM GRANULOCYTES NFR BLD AUTO: 0.2 %
LYMPHOCYTES # BLD AUTO: 2.91 K/UL
LYMPHOCYTES NFR BLD AUTO: 48.1 %
MAN DIFF?: NORMAL
MCHC RBC-ENTMCNC: 29.5 PG
MCHC RBC-ENTMCNC: 31.1 GM/DL
MCV RBC AUTO: 94.8 FL
MONOCYTES # BLD AUTO: 0.58 K/UL
MONOCYTES NFR BLD AUTO: 9.6 %
NEUTROPHILS # BLD AUTO: 1.94 K/UL
NEUTROPHILS NFR BLD AUTO: 32 %
PLATELET # BLD AUTO: 357 K/UL
POTASSIUM SERPL-SCNC: 4.3 MMOL/L
PROT SERPL-MCNC: 7.2 G/DL
RBC # BLD: 4.24 M/UL
RBC # FLD: 12.3 %
SODIUM SERPL-SCNC: 138 MMOL/L
WBC # FLD AUTO: 6.05 K/UL

## 2020-08-31 ENCOUNTER — APPOINTMENT (OUTPATIENT)
Dept: PULMONOLOGY | Facility: CLINIC | Age: 25
End: 2020-08-31
Payer: OTHER GOVERNMENT

## 2020-08-31 VITALS
BODY MASS INDEX: 20.09 KG/M2 | HEIGHT: 66 IN | TEMPERATURE: 99.1 F | HEART RATE: 82 BPM | DIASTOLIC BLOOD PRESSURE: 78 MMHG | SYSTOLIC BLOOD PRESSURE: 118 MMHG | OXYGEN SATURATION: 96 % | WEIGHT: 125 LBS

## 2020-08-31 DIAGNOSIS — J30.9 ALLERGIC RHINITIS, UNSPECIFIED: ICD-10-CM

## 2020-08-31 PROCEDURE — 99244 OFF/OP CNSLTJ NEW/EST MOD 40: CPT

## 2020-08-31 RX ORDER — MONTELUKAST 10 MG/1
10 TABLET, FILM COATED ORAL
Qty: 45 | Refills: 2 | Status: ACTIVE | COMMUNITY
Start: 2020-08-31 | End: 1900-01-01

## 2020-09-01 NOTE — CONSULT LETTER
[FreeTextEntry3] : Sincerely\par \par Joe [facility]jandra MD [facility]\par , [facility] [employer]\par Associate , Pulmonary and Critical Care Fellowship\par Pulmonary and Critical Care\par [facility]\par Phone: [phone]\par

## 2020-09-01 NOTE — HISTORY OF PRESENT ILLNESS
[TextBox_4] : 25 year old female, occasional marijuana smoker, history of asthma in childhood with latent TB (finished treatment in May 2020) was referred by Dr. Kay for persistent cough for last 2 months.\par Patient continued to have dry cough, which is persistent without any improvement. Cough is present through out the day, increased at night time, predominantly dry, associated with nasal congestion, constant throat clearing. She is also c/o occasional wheezing. Patient also feels tightness in chest while at work. Patient was seen by PCP in past and CXR was clear. He denies fever, chest pain or sore throat.\par

## 2020-09-01 NOTE — DISCUSSION/SUMMARY
[FreeTextEntry1] : 25 year old female, occasional marijuana smoker, history of asthma in childhood with latent TB (finished treatment in May 2020) was referred by Dr. Kay for persistent cough for last 2 months.\par \par Oxygen saturation 96% on RA at rest\par \par Review:\par CBC: Eosinophilia (9.4%)\par CXR: Scoliosis, no pulmonary filtrate\par \par A/P\par Cough is secondary to allergic rhinitis leading to post nasal drip. Lung examination did not reveal any wheezing. No s/s suggestive of asthma or GERD\par - Nasal irrigation twice daily. \par - Flonase nasal spray twice daily\par - Singulair 10 mg once in evening\par - Follow up after 4 weeks\par

## 2020-09-01 NOTE — REVIEW OF SYSTEMS
[Fever] : no fever [Chills] : no chills [Dry Eyes] : no dry eyes [Hemoptysis] : no hemoptysis [Chest Tightness] : no chest tightness [Frequent URIs] : no frequent URIs [Pleuritic Pain] : no pleuritic pain [Chest Discomfort] : no chest discomfort [Orthopnea] : no orthopnea [Hay Fever] : no hay fever [Nasal Discharge] : no nasal discharge [GERD] : no gerd [Diarrhea] : no diarrhea [Nocturia] : no nocturia [Irregular Menses] : no irregular menses [TextBox_151] : rest of ROS negative

## 2020-09-08 ENCOUNTER — APPOINTMENT (OUTPATIENT)
Dept: PULMONOLOGY | Facility: CLINIC | Age: 25
End: 2020-09-08

## 2020-10-22 ENCOUNTER — APPOINTMENT (OUTPATIENT)
Dept: FAMILY MEDICINE | Facility: CLINIC | Age: 25
End: 2020-10-22

## 2020-11-16 ENCOUNTER — TRANSCRIPTION ENCOUNTER (OUTPATIENT)
Age: 25
End: 2020-11-16

## 2020-11-24 ENCOUNTER — APPOINTMENT (OUTPATIENT)
Dept: FAMILY MEDICINE | Facility: CLINIC | Age: 25
End: 2020-11-24
Payer: OTHER GOVERNMENT

## 2020-11-24 VITALS
DIASTOLIC BLOOD PRESSURE: 75 MMHG | SYSTOLIC BLOOD PRESSURE: 110 MMHG | HEIGHT: 66 IN | HEART RATE: 88 BPM | OXYGEN SATURATION: 98 % | TEMPERATURE: 98.1 F | WEIGHT: 123 LBS | RESPIRATION RATE: 16 BRPM | BODY MASS INDEX: 19.77 KG/M2

## 2020-11-24 DIAGNOSIS — Z20.828 CONTACT WITH AND (SUSPECTED) EXPOSURE TO OTHER VIRAL COMMUNICABLE DISEASES: ICD-10-CM

## 2020-11-24 PROCEDURE — 36415 COLL VENOUS BLD VENIPUNCTURE: CPT

## 2020-11-24 PROCEDURE — 99214 OFFICE O/P EST MOD 30 MIN: CPT | Mod: 25

## 2020-11-24 NOTE — PHYSICAL EXAM
[No Acute Distress] : no acute distress [Normal Sclera/Conjunctiva] : normal sclera/conjunctiva [Normal Outer Ear/Nose] : the outer ears and nose were normal in appearance [No JVD] : no jugular venous distention [No Respiratory Distress] : no respiratory distress  [No Accessory Muscle Use] : no accessory muscle use [Clear to Auscultation] : lungs were clear to auscultation bilaterally [Normal Rate] : normal rate  [Regular Rhythm] : with a regular rhythm [Normal S1, S2] : normal S1 and S2 [No Edema] : there was no peripheral edema [No Joint Swelling] : no joint swelling [No Rash] : no rash [Coordination Grossly Intact] : coordination grossly intact [Normal Insight/Judgement] : insight and judgment were intact

## 2020-11-24 NOTE — HISTORY OF PRESENT ILLNESS
[de-identified] : 24 yo female here for acute visit.\par \par Admitting to being sick since last Seb 11/15. C/o eye redness, body aches, cough, fatigue. Also with fever of 102 at that time. Had 2 negative COVID tests so far, one rapid and one PCR on 11/16. Still with cough and shortness of breath at night. Flonase prn helps somewhat. No fever or chills since day 2. Admitting to COVID contact. Saw her sister 11/11, several days prior to her symptoms starting, but not since then. Her sister had a positive COVID test today.

## 2020-11-25 ENCOUNTER — TRANSCRIPTION ENCOUNTER (OUTPATIENT)
Age: 25
End: 2020-11-25

## 2020-12-04 ENCOUNTER — TRANSCRIPTION ENCOUNTER (OUTPATIENT)
Age: 25
End: 2020-12-04

## 2021-01-21 ENCOUNTER — APPOINTMENT (OUTPATIENT)
Dept: FAMILY MEDICINE | Facility: CLINIC | Age: 26
End: 2021-01-21
Payer: OTHER GOVERNMENT

## 2021-01-21 VITALS
HEIGHT: 66 IN | HEART RATE: 78 BPM | OXYGEN SATURATION: 100 % | RESPIRATION RATE: 16 BRPM | BODY MASS INDEX: 19.77 KG/M2 | TEMPERATURE: 98.1 F | SYSTOLIC BLOOD PRESSURE: 119 MMHG | WEIGHT: 123 LBS | DIASTOLIC BLOOD PRESSURE: 76 MMHG

## 2021-01-21 DIAGNOSIS — M41.9 SCOLIOSIS, UNSPECIFIED: ICD-10-CM

## 2021-01-21 DIAGNOSIS — R05 COUGH: ICD-10-CM

## 2021-01-21 PROCEDURE — 99395 PREV VISIT EST AGE 18-39: CPT | Mod: 25

## 2021-01-21 PROCEDURE — 36415 COLL VENOUS BLD VENIPUNCTURE: CPT

## 2021-01-21 PROCEDURE — 99213 OFFICE O/P EST LOW 20 MIN: CPT | Mod: 25

## 2021-01-21 PROCEDURE — 99072 ADDL SUPL MATRL&STAF TM PHE: CPT

## 2021-01-21 RX ORDER — PYRIDOXINE HCL (VITAMIN B6) 25 MG
25 TABLET ORAL
Qty: 30 | Refills: 5 | Status: DISCONTINUED | COMMUNITY
Start: 2019-11-22 | End: 2021-01-21

## 2021-01-21 RX ORDER — FLUTICASONE PROPIONATE 50 UG/1
50 SPRAY, METERED NASAL TWICE DAILY
Qty: 1 | Refills: 2 | Status: DISCONTINUED | COMMUNITY
Start: 2020-08-31 | End: 2021-01-21

## 2021-01-21 RX ORDER — AZITHROMYCIN 250 MG/1
250 TABLET, FILM COATED ORAL
Qty: 6 | Refills: 0 | Status: DISCONTINUED | COMMUNITY
Start: 2020-11-24 | End: 2021-01-21

## 2021-01-21 RX ORDER — CHOLECALCIFEROL (VITAMIN D3) 1250 MCG
1.25 MG CAPSULE ORAL
Qty: 8 | Refills: 0 | Status: DISCONTINUED | COMMUNITY
Start: 2019-10-11 | End: 2021-01-21

## 2021-01-21 RX ORDER — ISONIAZID 300 MG/1
300 TABLET ORAL
Qty: 30 | Refills: 5 | Status: DISCONTINUED | COMMUNITY
Start: 2019-11-22 | End: 2021-01-21

## 2021-01-21 NOTE — HISTORY OF PRESENT ILLNESS
[de-identified] : 24 yo female here for CPE. Feeling well today, no complaints. \par \par H/o scoliosis and back pain which has responded to massage in the past. \par \par Also with chronic cough. Saw Pulm. Was given montelukast which helps when she takes it.

## 2021-01-21 NOTE — PHYSICAL EXAM
[No Acute Distress] : no acute distress [Well Nourished] : well nourished [Well Developed] : well developed [Well-Appearing] : well-appearing [Normal Sclera/Conjunctiva] : normal sclera/conjunctiva [PERRL] : pupils equal round and reactive to light [EOMI] : extraocular movements intact [Normal Outer Ear/Nose] : the outer ears and nose were normal in appearance [Normal Oropharynx] : the oropharynx was normal [No JVD] : no jugular venous distention [No Lymphadenopathy] : no lymphadenopathy [Supple] : supple [Thyroid Normal, No Nodules] : the thyroid was normal and there were no nodules present [No Respiratory Distress] : no respiratory distress  [No Accessory Muscle Use] : no accessory muscle use [Clear to Auscultation] : lungs were clear to auscultation bilaterally [Normal Rate] : normal rate  [Regular Rhythm] : with a regular rhythm [No Murmur] : no murmur heard [Normal S1, S2] : normal S1 and S2 [No Abdominal Bruit] : a ~M bruit was not heard ~T in the abdomen [No Varicosities] : no varicosities [No Edema] : there was no peripheral edema [No Palpable Aorta] : no palpable aorta [No Extremity Clubbing/Cyanosis] : no extremity clubbing/cyanosis [Soft] : abdomen soft [Non Tender] : non-tender [Non-distended] : non-distended [No Masses] : no abdominal mass palpated [No HSM] : no HSM [Normal Bowel Sounds] : normal bowel sounds [Normal Posterior Cervical Nodes] : no posterior cervical lymphadenopathy [Normal Anterior Cervical Nodes] : no anterior cervical lymphadenopathy [No CVA Tenderness] : no CVA  tenderness [No Spinal Tenderness] : no spinal tenderness [No Joint Swelling] : no joint swelling [Grossly Normal Strength/Tone] : grossly normal strength/tone [No Rash] : no rash [Coordination Grossly Intact] : coordination grossly intact [No Focal Deficits] : no focal deficits [Deep Tendon Reflexes (DTR)] : deep tendon reflexes were 2+ and symmetric [Normal Gait] : normal gait [Normal Affect] : the affect was normal [Normal Insight/Judgement] : insight and judgment were intact

## 2021-01-21 NOTE — HEALTH RISK ASSESSMENT
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

## 2021-01-21 NOTE — HEALTH RISK ASSESSMENT
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

## 2021-01-21 NOTE — ASSESSMENT
[FreeTextEntry1] : CPE- Well exam, comprehensive labs ordered. STD panel sent. Pap up to date. Follow up labs.

## 2021-01-21 NOTE — PHYSICAL EXAM
[No Acute Distress] : no acute distress [Well Nourished] : well nourished [Well Developed] : well developed [Well-Appearing] : well-appearing [Normal Sclera/Conjunctiva] : normal sclera/conjunctiva [PERRL] : pupils equal round and reactive to light [EOMI] : extraocular movements intact [Normal Outer Ear/Nose] : the outer ears and nose were normal in appearance [Normal Oropharynx] : the oropharynx was normal [No JVD] : no jugular venous distention [No Lymphadenopathy] : no lymphadenopathy [Thyroid Normal, No Nodules] : the thyroid was normal and there were no nodules present [Supple] : supple [No Respiratory Distress] : no respiratory distress  [No Accessory Muscle Use] : no accessory muscle use [Clear to Auscultation] : lungs were clear to auscultation bilaterally [Normal Rate] : normal rate  [Regular Rhythm] : with a regular rhythm [Normal S1, S2] : normal S1 and S2 [No Murmur] : no murmur heard [No Abdominal Bruit] : a ~M bruit was not heard ~T in the abdomen [No Varicosities] : no varicosities [No Edema] : there was no peripheral edema [No Palpable Aorta] : no palpable aorta [No Extremity Clubbing/Cyanosis] : no extremity clubbing/cyanosis [Soft] : abdomen soft [Non Tender] : non-tender [Non-distended] : non-distended [No Masses] : no abdominal mass palpated [No HSM] : no HSM [Normal Bowel Sounds] : normal bowel sounds [Normal Posterior Cervical Nodes] : no posterior cervical lymphadenopathy [Normal Anterior Cervical Nodes] : no anterior cervical lymphadenopathy [No CVA Tenderness] : no CVA  tenderness [No Spinal Tenderness] : no spinal tenderness [No Joint Swelling] : no joint swelling [Grossly Normal Strength/Tone] : grossly normal strength/tone [No Rash] : no rash [Coordination Grossly Intact] : coordination grossly intact [No Focal Deficits] : no focal deficits [Deep Tendon Reflexes (DTR)] : deep tendon reflexes were 2+ and symmetric [Normal Gait] : normal gait [Normal Affect] : the affect was normal [Normal Insight/Judgement] : insight and judgment were intact

## 2021-01-21 NOTE — HISTORY OF PRESENT ILLNESS
[de-identified] : 24 yo female here for CPE. Feeling well today, no complaints. \par \par H/o scoliosis and back pain which has responded to massage in the past. \par \par Also with chronic cough. Saw Pulm. Was given montelukast which helps when she takes it.

## 2021-01-22 LAB
25(OH)D3 SERPL-MCNC: 20.7 NG/ML
ALBUMIN SERPL ELPH-MCNC: 4.5 G/DL
ALP BLD-CCNC: 75 U/L
ALT SERPL-CCNC: 16 U/L
ANION GAP SERPL CALC-SCNC: 14 MMOL/L
APPEARANCE: CLEAR
AST SERPL-CCNC: 19 U/L
BACTERIA: NEGATIVE
BASOPHILS # BLD AUTO: 0.03 K/UL
BASOPHILS NFR BLD AUTO: 0.5 %
BILIRUB SERPL-MCNC: 0.4 MG/DL
BILIRUBIN URINE: NEGATIVE
BLOOD URINE: NEGATIVE
BUN SERPL-MCNC: 13 MG/DL
CALCIUM SERPL-MCNC: 9.3 MG/DL
CHLORIDE SERPL-SCNC: 103 MMOL/L
CHOLEST SERPL-MCNC: 145 MG/DL
CO2 SERPL-SCNC: 22 MMOL/L
COLOR: NORMAL
CREAT SERPL-MCNC: 0.67 MG/DL
EOSINOPHIL # BLD AUTO: 0.1 K/UL
EOSINOPHIL NFR BLD AUTO: 1.6 %
ESTIMATED AVERAGE GLUCOSE: 103 MG/DL
FOLATE SERPL-MCNC: 11.6 NG/ML
GLUCOSE QUALITATIVE U: NEGATIVE
GLUCOSE SERPL-MCNC: 81 MG/DL
HAV IGM SER QL: NONREACTIVE
HBA1C MFR BLD HPLC: 5.2 %
HBV CORE IGM SER QL: NONREACTIVE
HBV SURFACE AG SER QL: NONREACTIVE
HCT VFR BLD CALC: 39.8 %
HCV AB SER QL: NONREACTIVE
HCV S/CO RATIO: 0.1 S/CO
HDLC SERPL-MCNC: 65 MG/DL
HGB BLD-MCNC: 12.9 G/DL
HIV1+2 AB SPEC QL IA.RAPID: NONREACTIVE
HYALINE CASTS: 4 /LPF
IMM GRANULOCYTES NFR BLD AUTO: 0.2 %
KETONES URINE: NORMAL
LDLC SERPL CALC-MCNC: 74 MG/DL
LEUKOCYTE ESTERASE URINE: NEGATIVE
LYMPHOCYTES # BLD AUTO: 3.44 K/UL
LYMPHOCYTES NFR BLD AUTO: 55.4 %
MAN DIFF?: NORMAL
MCHC RBC-ENTMCNC: 30.1 PG
MCHC RBC-ENTMCNC: 32.4 GM/DL
MCV RBC AUTO: 93 FL
MICROSCOPIC-UA: NORMAL
MONOCYTES # BLD AUTO: 0.38 K/UL
MONOCYTES NFR BLD AUTO: 6.1 %
NEUTROPHILS # BLD AUTO: 2.25 K/UL
NEUTROPHILS NFR BLD AUTO: 36.2 %
NITRITE URINE: NEGATIVE
NONHDLC SERPL-MCNC: 80 MG/DL
PH URINE: 5.5
PLATELET # BLD AUTO: 335 K/UL
POTASSIUM SERPL-SCNC: 4.1 MMOL/L
PROT SERPL-MCNC: 6.8 G/DL
PROTEIN URINE: NORMAL
RBC # BLD: 4.28 M/UL
RBC # FLD: 12.1 %
RED BLOOD CELLS URINE: 3 /HPF
SARS-COV-2 IGG SERPL IA-ACNC: 0.07 INDEX
SARS-COV-2 IGG SERPL QL IA: NEGATIVE
SODIUM SERPL-SCNC: 139 MMOL/L
SPECIFIC GRAVITY URINE: 1.02
SQUAMOUS EPITHELIAL CELLS: 7 /HPF
T PALLIDUM AB SER QL IA: NEGATIVE
T4 FREE SERPL-MCNC: 1.3 NG/DL
TRIGL SERPL-MCNC: 28 MG/DL
TSH SERPL-ACNC: 1.11 UIU/ML
UROBILINOGEN URINE: NORMAL
VIT B12 SERPL-MCNC: 414 PG/ML
WBC # FLD AUTO: 6.21 K/UL
WHITE BLOOD CELLS URINE: 3 /HPF

## 2021-01-25 LAB
C TRACH RRNA SPEC QL NAA+PROBE: NOT DETECTED
N GONORRHOEA RRNA SPEC QL NAA+PROBE: NOT DETECTED
SOURCE AMPLIFICATION: NORMAL

## 2021-03-16 ENCOUNTER — TRANSCRIPTION ENCOUNTER (OUTPATIENT)
Age: 26
End: 2021-03-16

## 2021-04-14 ENCOUNTER — APPOINTMENT (OUTPATIENT)
Dept: PHYSICAL MEDICINE AND REHAB | Facility: CLINIC | Age: 26
End: 2021-04-14

## 2021-04-19 ENCOUNTER — APPOINTMENT (OUTPATIENT)
Dept: PHYSICAL MEDICINE AND REHAB | Facility: CLINIC | Age: 26
End: 2021-04-19
Payer: OTHER GOVERNMENT

## 2021-04-19 VITALS — RESPIRATION RATE: 16 BRPM | HEIGHT: 66 IN | WEIGHT: 123 LBS | BODY MASS INDEX: 19.77 KG/M2 | OXYGEN SATURATION: 100 %

## 2021-04-19 DIAGNOSIS — M54.5 LOW BACK PAIN: ICD-10-CM

## 2021-04-19 PROCEDURE — 99072 ADDL SUPL MATRL&STAF TM PHE: CPT

## 2021-04-19 PROCEDURE — 99203 OFFICE O/P NEW LOW 30 MIN: CPT

## 2021-04-19 NOTE — PHYSICAL EXAM
[FreeTextEntry1] : LUMBAR\par GEN: AAOx3. NAD.\par LS ROM: Flexion full/pain free. Extension/oblique extension full (+) axial Sx. \par HIP ROM: Full and pain B/L.\par PALP: No TTP midline spinous processes, paravertebral muscles, SIJ, or greater trochanters B/L.\par INSP: Spine alignment is midline, with no evidence of scoliosis.\par STRENGTH: 5/5 bilateral hip flexors, knee extensors, ankle dorsiflexors, long toe extensors, ankle plantar flexors, hip abductors.\par SENS: Grossly intact to LT BLE.\par REFLEXES: Symmetric patella, achilles. \par TONE: Normal, No clonus.\par STANCE: No Trendelenburg with single leg stance.\par GAIT: Non antalgic, normal reciprocating heel to toe\par SPECIAL: SLR and Slump (-) bilaterally. FADIR/RADHA (-) B/L.

## 2021-04-19 NOTE — ASSESSMENT
[FreeTextEntry1] : Impression:\par 1. Lumbar Facet syndrome\par \par Plan: The history and physical examination were reviewed. Symptoms are consistent with lumbago likely facet mediated. The diagnosis was discussed with the patient along with treatment options. We also discussed the importance of ergonomics and posture as they pertain to the current condition as well as overall health and well being. I am recommending that the patient undergo a course of physical therapy. We emphasized the importance of the home exercise program. The patient was educated on red flag symptoms and was instructed to call the office should the current condition worsen or new symptoms develop. The patient will follow up in 6-8 weeks. The patient expressed verbal understanding and is in agreement with the plan of care. All of the patient's questions and concerns were addressed during today's visit.\par \par A total of 30 minutes was spent for the duration of this encounter.\par

## 2021-04-19 NOTE — HISTORY OF PRESENT ILLNESS
[FreeTextEntry1] : Ms. DEB VILLEGAS is a very pleasant 25 year female who comes in for evaluation of Lower back pain that has been ongoing for 1 year without any specific injury or inciting event. The pain is located primarily on her lower back non-radiating intermittent and described as achy. The pain is rated as 2/10 and ranges from 1-6/10. The patient's symptoms are aggravated by standing and alleviated by Ibuprofen. The patient works as a  which consists of sitting. The patient denies any night pain, numbness/tingling, weakness, or bowel/bladder dysfunction. The patient has no other complaints at this time.

## 2021-04-20 ENCOUNTER — APPOINTMENT (OUTPATIENT)
Dept: DERMATOLOGY | Facility: CLINIC | Age: 26
End: 2021-04-20
Payer: OTHER GOVERNMENT

## 2021-04-20 DIAGNOSIS — L72.0 EPIDERMAL CYST: ICD-10-CM

## 2021-04-20 DIAGNOSIS — L25.9 UNSPECIFIED CONTACT DERMATITIS, UNSPECIFIED CAUSE: ICD-10-CM

## 2021-04-20 PROCEDURE — 99072 ADDL SUPL MATRL&STAF TM PHE: CPT

## 2021-04-20 PROCEDURE — 99203 OFFICE O/P NEW LOW 30 MIN: CPT

## 2021-04-20 RX ORDER — TRETINOIN 0.25 MG/G
0.03 CREAM TOPICAL
Qty: 1 | Refills: 0 | Status: ACTIVE | COMMUNITY
Start: 2021-04-20 | End: 1900-01-01

## 2021-04-20 RX ORDER — TACROLIMUS 1 MG/G
0.1 OINTMENT TOPICAL
Qty: 1 | Refills: 2 | Status: ACTIVE | COMMUNITY
Start: 2021-04-20 | End: 1900-01-01

## 2021-07-06 ENCOUNTER — TRANSCRIPTION ENCOUNTER (OUTPATIENT)
Age: 26
End: 2021-07-06

## 2021-07-16 ENCOUNTER — TRANSCRIPTION ENCOUNTER (OUTPATIENT)
Age: 26
End: 2021-07-16

## 2021-08-02 ENCOUNTER — TRANSCRIPTION ENCOUNTER (OUTPATIENT)
Age: 26
End: 2021-08-02

## 2021-09-02 ENCOUNTER — APPOINTMENT (OUTPATIENT)
Dept: FAMILY MEDICINE | Facility: CLINIC | Age: 26
End: 2021-09-02
Payer: OTHER GOVERNMENT

## 2021-09-02 VITALS
OXYGEN SATURATION: 98 % | WEIGHT: 133 LBS | BODY MASS INDEX: 21.38 KG/M2 | HEART RATE: 83 BPM | TEMPERATURE: 98.1 F | SYSTOLIC BLOOD PRESSURE: 113 MMHG | DIASTOLIC BLOOD PRESSURE: 76 MMHG | HEIGHT: 66 IN

## 2021-09-02 DIAGNOSIS — Z86.16 PERSONAL HISTORY OF COVID-19: ICD-10-CM

## 2021-09-02 PROCEDURE — 99214 OFFICE O/P EST MOD 30 MIN: CPT | Mod: 25

## 2021-09-02 PROCEDURE — 36415 COLL VENOUS BLD VENIPUNCTURE: CPT

## 2021-09-02 NOTE — HISTORY OF PRESENT ILLNESS
[de-identified] : 27 yo female here for acute visit. \par \par Patient had COVID diagnosed 7/2. Had body aches, diarrhea, fatigue. No fever, chills, SOB, or CP. Had loss of taste or smell, which is now better. Stating that the fatigue lasted for a little over a month but she's finally felling better.  \par \par Also with history of abnormal CBC. Reports that she saw a hematologist 2 times and they were monitoring her counts.

## 2021-09-03 ENCOUNTER — TRANSCRIPTION ENCOUNTER (OUTPATIENT)
Age: 26
End: 2021-09-03

## 2021-09-03 LAB
ALBUMIN SERPL ELPH-MCNC: 4.2 G/DL
ALP BLD-CCNC: 84 U/L
ALT SERPL-CCNC: 14 U/L
ANION GAP SERPL CALC-SCNC: 10 MMOL/L
AST SERPL-CCNC: 17 U/L
BASOPHILS # BLD AUTO: 0.04 K/UL
BASOPHILS NFR BLD AUTO: 0.7 %
BILIRUB SERPL-MCNC: 0.2 MG/DL
BUN SERPL-MCNC: 11 MG/DL
CALCIUM SERPL-MCNC: 9.6 MG/DL
CHLORIDE SERPL-SCNC: 102 MMOL/L
CO2 SERPL-SCNC: 26 MMOL/L
CREAT SERPL-MCNC: 0.56 MG/DL
EOSINOPHIL # BLD AUTO: 0.35 K/UL
EOSINOPHIL NFR BLD AUTO: 6.2 %
GLUCOSE SERPL-MCNC: 84 MG/DL
HCT VFR BLD CALC: 39.3 %
HGB BLD-MCNC: 12.2 G/DL
IMM GRANULOCYTES NFR BLD AUTO: 0.2 %
LYMPHOCYTES # BLD AUTO: 2.89 K/UL
LYMPHOCYTES NFR BLD AUTO: 50.9 %
MAN DIFF?: NORMAL
MCHC RBC-ENTMCNC: 28.6 PG
MCHC RBC-ENTMCNC: 31 GM/DL
MCV RBC AUTO: 92 FL
MONOCYTES # BLD AUTO: 0.53 K/UL
MONOCYTES NFR BLD AUTO: 9.3 %
NEUTROPHILS # BLD AUTO: 1.86 K/UL
NEUTROPHILS NFR BLD AUTO: 32.7 %
PLATELET # BLD AUTO: 303 K/UL
POTASSIUM SERPL-SCNC: 4.2 MMOL/L
PROT SERPL-MCNC: 7.1 G/DL
RBC # BLD: 4.27 M/UL
RBC # FLD: 13.9 %
SODIUM SERPL-SCNC: 138 MMOL/L
WBC # FLD AUTO: 5.68 K/UL

## 2022-02-01 ENCOUNTER — APPOINTMENT (OUTPATIENT)
Dept: FAMILY MEDICINE | Facility: CLINIC | Age: 27
End: 2022-02-01
Payer: OTHER GOVERNMENT

## 2022-02-01 VITALS
DIASTOLIC BLOOD PRESSURE: 75 MMHG | WEIGHT: 133 LBS | HEIGHT: 66 IN | HEART RATE: 75 BPM | SYSTOLIC BLOOD PRESSURE: 115 MMHG | BODY MASS INDEX: 21.38 KG/M2 | TEMPERATURE: 98.2 F | OXYGEN SATURATION: 99 %

## 2022-02-01 DIAGNOSIS — H53.9 UNSPECIFIED VISUAL DISTURBANCE: ICD-10-CM

## 2022-02-01 DIAGNOSIS — I63.9 CEREBRAL INFARCTION, UNSPECIFIED: ICD-10-CM

## 2022-02-01 DIAGNOSIS — Z86.69 PERSONAL HISTORY OF OTHER DISEASES OF THE NERVOUS SYSTEM AND SENSE ORGANS: ICD-10-CM

## 2022-02-01 DIAGNOSIS — Z00.00 ENCOUNTER FOR GENERAL ADULT MEDICAL EXAMINATION W/OUT ABNORMAL FINDINGS: ICD-10-CM

## 2022-02-01 PROCEDURE — 36415 COLL VENOUS BLD VENIPUNCTURE: CPT

## 2022-02-01 PROCEDURE — 99213 OFFICE O/P EST LOW 20 MIN: CPT | Mod: 25

## 2022-02-01 PROCEDURE — 99395 PREV VISIT EST AGE 18-39: CPT | Mod: 25

## 2022-02-02 ENCOUNTER — NON-APPOINTMENT (OUTPATIENT)
Age: 27
End: 2022-02-02

## 2022-02-02 ENCOUNTER — TRANSCRIPTION ENCOUNTER (OUTPATIENT)
Age: 27
End: 2022-02-02

## 2022-02-02 LAB
25(OH)D3 SERPL-MCNC: 10.3 NG/ML
ALBUMIN SERPL ELPH-MCNC: 4.5 G/DL
ALP BLD-CCNC: 78 U/L
ALT SERPL-CCNC: 12 U/L
ANION GAP SERPL CALC-SCNC: 9 MMOL/L
APPEARANCE: CLEAR
AST SERPL-CCNC: 15 U/L
BACTERIA: NEGATIVE
BASOPHILS # BLD AUTO: 0.03 K/UL
BASOPHILS NFR BLD AUTO: 0.7 %
BILIRUB SERPL-MCNC: 0.4 MG/DL
BILIRUBIN URINE: NEGATIVE
BLOOD URINE: NEGATIVE
BUN SERPL-MCNC: 10 MG/DL
CALCIUM SERPL-MCNC: 9.6 MG/DL
CHLORIDE SERPL-SCNC: 107 MMOL/L
CHOLEST SERPL-MCNC: 146 MG/DL
CO2 SERPL-SCNC: 25 MMOL/L
COLOR: NORMAL
CREAT SERPL-MCNC: 0.59 MG/DL
EOSINOPHIL # BLD AUTO: 0.24 K/UL
EOSINOPHIL NFR BLD AUTO: 5.3 %
ESTIMATED AVERAGE GLUCOSE: 108 MG/DL
FOLATE SERPL-MCNC: 7.6 NG/ML
GLUCOSE QUALITATIVE U: NEGATIVE
GLUCOSE SERPL-MCNC: 78 MG/DL
HAV IGM SER QL: NONREACTIVE
HBA1C MFR BLD HPLC: 5.4 %
HBV CORE IGM SER QL: NONREACTIVE
HBV SURFACE AG SER QL: NONREACTIVE
HCT VFR BLD CALC: 41.4 %
HCV AB SER QL: NONREACTIVE
HCV S/CO RATIO: 0.15 S/CO
HDLC SERPL-MCNC: 53 MG/DL
HGB BLD-MCNC: 13.1 G/DL
HIV1+2 AB SPEC QL IA.RAPID: NONREACTIVE
HYALINE CASTS: 0 /LPF
IMM GRANULOCYTES NFR BLD AUTO: 0.2 %
KETONES URINE: NEGATIVE
LDLC SERPL CALC-MCNC: 75 MG/DL
LEUKOCYTE ESTERASE URINE: NEGATIVE
LYMPHOCYTES # BLD AUTO: 1.99 K/UL
LYMPHOCYTES NFR BLD AUTO: 43.6 %
MAN DIFF?: NORMAL
MCHC RBC-ENTMCNC: 29.4 PG
MCHC RBC-ENTMCNC: 31.6 GM/DL
MCV RBC AUTO: 93 FL
MICROSCOPIC-UA: NORMAL
MONOCYTES # BLD AUTO: 0.45 K/UL
MONOCYTES NFR BLD AUTO: 9.9 %
NEUTROPHILS # BLD AUTO: 1.84 K/UL
NEUTROPHILS NFR BLD AUTO: 40.3 %
NITRITE URINE: NEGATIVE
NONHDLC SERPL-MCNC: 93 MG/DL
PH URINE: 5.5
PLATELET # BLD AUTO: 351 K/UL
POTASSIUM SERPL-SCNC: 4.5 MMOL/L
PROT SERPL-MCNC: 7.2 G/DL
PROTEIN URINE: NEGATIVE
RBC # BLD: 4.45 M/UL
RBC # FLD: 12.2 %
RED BLOOD CELLS URINE: 2 /HPF
SODIUM SERPL-SCNC: 141 MMOL/L
SPECIFIC GRAVITY URINE: 1.01
SQUAMOUS EPITHELIAL CELLS: 2 /HPF
T PALLIDUM AB SER QL IA: NEGATIVE
T4 FREE SERPL-MCNC: 1.1 NG/DL
TRIGL SERPL-MCNC: 92 MG/DL
TSH SERPL-ACNC: 0.95 UIU/ML
UROBILINOGEN URINE: NORMAL
VIT B12 SERPL-MCNC: 458 PG/ML
WBC # FLD AUTO: 4.56 K/UL
WHITE BLOOD CELLS URINE: 1 /HPF

## 2022-02-02 RX ORDER — ERGOCALCIFEROL 1.25 MG/1
1.25 MG CAPSULE, LIQUID FILLED ORAL
Qty: 8 | Refills: 0 | Status: ACTIVE | COMMUNITY
Start: 2022-02-02 | End: 1900-01-01

## 2022-03-01 ENCOUNTER — TRANSCRIPTION ENCOUNTER (OUTPATIENT)
Age: 27
End: 2022-03-01

## 2022-03-06 ENCOUNTER — TRANSCRIPTION ENCOUNTER (OUTPATIENT)
Age: 27
End: 2022-03-06

## 2022-06-16 ENCOUNTER — APPOINTMENT (OUTPATIENT)
Dept: NEUROLOGY | Facility: CLINIC | Age: 27
End: 2022-06-16

## 2022-08-10 ENCOUNTER — NON-APPOINTMENT (OUTPATIENT)
Age: 27
End: 2022-08-10

## 2022-08-10 DIAGNOSIS — Z78.9 OTHER SPECIFIED HEALTH STATUS: ICD-10-CM

## 2022-08-10 DIAGNOSIS — Z87.42 PERSONAL HISTORY OF OTHER DISEASES OF THE FEMALE GENITAL TRACT: ICD-10-CM

## 2022-08-10 DIAGNOSIS — O02.1 MISSED ABORTION: ICD-10-CM

## 2022-08-12 ENCOUNTER — APPOINTMENT (OUTPATIENT)
Dept: INTERNAL MEDICINE | Facility: CLINIC | Age: 27
End: 2022-08-12

## 2022-08-12 VITALS
HEART RATE: 90 BPM | TEMPERATURE: 98.5 F | BODY MASS INDEX: 20.57 KG/M2 | DIASTOLIC BLOOD PRESSURE: 76 MMHG | WEIGHT: 128 LBS | OXYGEN SATURATION: 99 % | HEIGHT: 66 IN | SYSTOLIC BLOOD PRESSURE: 125 MMHG

## 2022-08-12 DIAGNOSIS — Z13.29 ENCOUNTER FOR SCREENING FOR OTHER SUSPECTED ENDOCRINE DISORDER: ICD-10-CM

## 2022-08-12 DIAGNOSIS — R76.0 RAISED ANTIBODY TITER: ICD-10-CM

## 2022-08-12 DIAGNOSIS — R53.1 WEAKNESS: ICD-10-CM

## 2022-08-12 DIAGNOSIS — Z11.3 ENCOUNTER FOR SCREENING FOR INFECTIONS WITH A PREDOMINANTLY SEXUAL MODE OF TRANSMISSION: ICD-10-CM

## 2022-08-12 DIAGNOSIS — R79.9 ABNORMAL FINDING OF BLOOD CHEMISTRY, UNSPECIFIED: ICD-10-CM

## 2022-08-12 DIAGNOSIS — E55.9 VITAMIN D DEFICIENCY, UNSPECIFIED: ICD-10-CM

## 2022-08-12 PROCEDURE — 99214 OFFICE O/P EST MOD 30 MIN: CPT | Mod: 25

## 2022-08-12 PROCEDURE — 36415 COLL VENOUS BLD VENIPUNCTURE: CPT

## 2022-08-13 LAB
25(OH)D3 SERPL-MCNC: 29 NG/ML
BASOPHILS # BLD AUTO: 0.02 K/UL
BASOPHILS NFR BLD AUTO: 0.4 %
EOSINOPHIL # BLD AUTO: 0.1 K/UL
EOSINOPHIL NFR BLD AUTO: 2 %
HCT VFR BLD CALC: 37.8 %
HGB BLD-MCNC: 12.1 G/DL
HIV1+2 AB SPEC QL IA.RAPID: NONREACTIVE
HSV 1+2 IGG SER IA-IMP: POSITIVE
HSV 1+2 IGG SER IA-IMP: POSITIVE
HSV1 IGG SER QL: 7.11 INDEX
HSV2 IGG SER QL: 2.38 INDEX
IMM GRANULOCYTES NFR BLD AUTO: 0.2 %
LYMPHOCYTES # BLD AUTO: 2.25 K/UL
LYMPHOCYTES NFR BLD AUTO: 44.5 %
MAN DIFF?: NORMAL
MCHC RBC-ENTMCNC: 29.4 PG
MCHC RBC-ENTMCNC: 32 GM/DL
MCV RBC AUTO: 91.7 FL
MONOCYTES # BLD AUTO: 0.57 K/UL
MONOCYTES NFR BLD AUTO: 11.3 %
NEUTROPHILS # BLD AUTO: 2.11 K/UL
NEUTROPHILS NFR BLD AUTO: 41.6 %
PLATELET # BLD AUTO: 345 K/UL
RBC # BLD: 4.12 M/UL
RBC # FLD: 13.1 %
T PALLIDUM AB SER QL IA: NEGATIVE
TSH SERPL-ACNC: 0.65 UIU/ML
WBC # FLD AUTO: 5.06 K/UL

## 2022-08-15 ENCOUNTER — APPOINTMENT (OUTPATIENT)
Dept: OBGYN | Facility: CLINIC | Age: 27
End: 2022-08-15

## 2022-08-15 VITALS
WEIGHT: 128 LBS | BODY MASS INDEX: 20.57 KG/M2 | SYSTOLIC BLOOD PRESSURE: 119 MMHG | DIASTOLIC BLOOD PRESSURE: 86 MMHG | HEIGHT: 66 IN

## 2022-08-15 DIAGNOSIS — R10.2 PELVIC AND PERINEAL PAIN: ICD-10-CM

## 2022-08-15 DIAGNOSIS — Z01.419 ENCOUNTER FOR GYNECOLOGICAL EXAMINATION (GENERAL) (ROUTINE) W/OUT ABNORMAL FINDINGS: ICD-10-CM

## 2022-08-15 LAB
BILIRUB UR QL STRIP: NORMAL
C TRACH RRNA SPEC QL NAA+PROBE: NOT DETECTED
CLARITY UR: CLEAR
COLLECTION METHOD: NORMAL
DEPRECATED KAPPA LC FREE/LAMBDA SER: 1.12 RATIO
GLUCOSE UR-MCNC: NORMAL
HCG UR QL: 0.2 EU/DL
HCG UR QL: NEGATIVE
HGB UR QL STRIP.AUTO: NORMAL
IGA SER QL IEP: 169 MG/DL
IGG SER QL IEP: 1144 MG/DL
IGM SER QL IEP: 305 MG/DL
KAPPA LC CSF-MCNC: 1.4 MG/DL
KAPPA LC SERPL-MCNC: 1.57 MG/DL
KETONES UR-MCNC: NORMAL
LEUKOCYTE ESTERASE UR QL STRIP: NORMAL
N GONORRHOEA RRNA SPEC QL NAA+PROBE: NOT DETECTED
NITRITE UR QL STRIP: NORMAL
PH UR STRIP: 8
PROT UR STRIP-MCNC: NORMAL
SOURCE AMPLIFICATION: NORMAL
SP GR UR STRIP: 1.02

## 2022-08-15 PROCEDURE — 99213 OFFICE O/P EST LOW 20 MIN: CPT | Mod: 25

## 2022-08-15 PROCEDURE — 81025 URINE PREGNANCY TEST: CPT

## 2022-08-15 PROCEDURE — 81003 URINALYSIS AUTO W/O SCOPE: CPT | Mod: QW

## 2022-08-15 PROCEDURE — 76830 TRANSVAGINAL US NON-OB: CPT

## 2022-08-15 PROCEDURE — 99395 PREV VISIT EST AGE 18-39: CPT

## 2022-08-15 NOTE — HISTORY OF PRESENT ILLNESS
[FreeTextEntry1] : 28 y/o p0010 LMP 7/28/22 here for f/u.  notes pelvic cramps in past two weeks, no n/v/d.  good appetite, not constipated, no UTI sx.  \par \par missed ab x 1\par rheumatoid arthritis, no meds\par ct x 1\par no smoke/drugs/etoh/domestic violence

## 2022-08-15 NOTE — PLAN
[FreeTextEntry1] : 28 y/o p0010 with pain and h/o std\par reports recent std bw by pcp\par pap/gc/ct sent from office\par for detailed pelvic sono\par precautions reviewed\par time 20 min

## 2022-08-16 LAB
C TRACH RRNA SPEC QL NAA+PROBE: NOT DETECTED
N GONORRHOEA RRNA SPEC QL NAA+PROBE: NOT DETECTED
SOURCE TP AMPLIFICATION: NORMAL

## 2022-08-19 LAB — CYTOLOGY CVX/VAG DOC THIN PREP: NORMAL

## 2022-12-31 NOTE — HISTORY OF PRESENT ILLNESS
[de-identified] : 25 yo female here for CPE. Feeling well today.\par \par H/o chronic headaches about once a month since childhood. Reports symptoms seem to be worsening recently. Thinks it is related to increased vision issues with driving at night recently. Hasn't had recent eye exam. H/o stroke in childhood, old infarct seen on cat scan (patient stating cause and timeframe was unknown). Also reports seizure activity when she was 17. Stating that she was smoking hooka the night she had seizure at a party, otherwise no drug use. Was admitted to hospital and was monitored, but reports no seizure activity was found and she wasn't started on seizure meds. Hasn't seen Neuro in 10 years. 
no

## 2022-12-31 NOTE — HEALTH RISK ASSESSMENT
[Good] : ~his/her~  mood as  good [0] : 2) Feeling down, depressed, or hopeless: Not at all (0) [HIV Test offered] : HIV Test offered [Hepatitis C test offered] : Hepatitis C test offered [Employed] : employed [PHQ-2 Negative - No further assessment needed] : PHQ-2 Negative - No further assessment needed [[id number]] : 0 [de-identified] : parents [de-identified] :

## 2023-01-12 ENCOUNTER — NON-APPOINTMENT (OUTPATIENT)
Age: 28
End: 2023-01-12

## 2025-07-30 ENCOUNTER — APPOINTMENT (OUTPATIENT)
Dept: OBGYN | Facility: CLINIC | Age: 30
End: 2025-07-30
Payer: OTHER GOVERNMENT

## 2025-07-30 ENCOUNTER — NON-APPOINTMENT (OUTPATIENT)
Age: 30
End: 2025-07-30

## 2025-07-30 ENCOUNTER — LABORATORY RESULT (OUTPATIENT)
Age: 30
End: 2025-07-30

## 2025-07-30 VITALS
HEART RATE: 79 BPM | HEIGHT: 64 IN | DIASTOLIC BLOOD PRESSURE: 76 MMHG | WEIGHT: 138 LBS | BODY MASS INDEX: 23.56 KG/M2 | SYSTOLIC BLOOD PRESSURE: 116 MMHG

## 2025-07-30 DIAGNOSIS — Z01.419 ENCOUNTER FOR GYNECOLOGICAL EXAMINATION (GENERAL) (ROUTINE) W/OUT ABNORMAL FINDINGS: ICD-10-CM

## 2025-07-30 LAB
BILIRUB UR QL STRIP: NORMAL
GLUCOSE UR-MCNC: NORMAL
HCG UR QL: 0.2 EU/DL
HCG UR QL: NEGATIVE
HGB UR QL STRIP.AUTO: NORMAL
KETONES UR-MCNC: NORMAL
LEUKOCYTE ESTERASE UR QL STRIP: NORMAL
NITRITE UR QL STRIP: NORMAL
PH UR STRIP: 5.5
PROT UR STRIP-MCNC: NORMAL
QUALITY CONTROL: YES
SP GR UR STRIP: 1.02

## 2025-07-30 PROCEDURE — 81025 URINE PREGNANCY TEST: CPT

## 2025-07-30 PROCEDURE — 99459 PELVIC EXAMINATION: CPT

## 2025-07-30 PROCEDURE — G0444 DEPRESSION SCREEN ANNUAL: CPT | Mod: 59

## 2025-07-30 PROCEDURE — 99395 PREV VISIT EST AGE 18-39: CPT

## 2025-07-30 PROCEDURE — 81003 URINALYSIS AUTO W/O SCOPE: CPT | Mod: QW

## 2025-08-04 LAB — CYTOLOGY CVX/VAG DOC THIN PREP: NORMAL

## 2025-08-13 LAB — HPV HIGH+LOW RISK DNA PNL CVX: DETECTED
